# Patient Record
Sex: FEMALE | Employment: OTHER
[De-identification: names, ages, dates, MRNs, and addresses within clinical notes are randomized per-mention and may not be internally consistent; named-entity substitution may affect disease eponyms.]

---

## 2020-11-01 ENCOUNTER — OFFICE VISIT (OUTPATIENT)
Age: 60
End: 2020-11-01

## 2021-01-06 ENCOUNTER — TELEPHONE ENCOUNTER (OUTPATIENT)
Dept: URBAN - METROPOLITAN AREA CLINIC 9 | Facility: CLINIC | Age: 61
End: 2021-01-06

## 2021-01-06 ENCOUNTER — OFFICE VISIT (OUTPATIENT)
Dept: URBAN - METROPOLITAN AREA CLINIC 9 | Facility: CLINIC | Age: 61
End: 2021-01-06

## 2021-01-07 ENCOUNTER — TELEPHONE ENCOUNTER (OUTPATIENT)
Dept: URBAN - METROPOLITAN AREA CLINIC 9 | Facility: CLINIC | Age: 61
End: 2021-01-07

## 2021-01-19 ENCOUNTER — OFFICE VISIT (OUTPATIENT)
Dept: URBAN - METROPOLITAN AREA SURGERY CENTER 9 | Facility: SURGERY CENTER | Age: 61
End: 2021-01-19

## 2021-05-01 ENCOUNTER — OFFICE VISIT (OUTPATIENT)
Age: 61
End: 2021-05-01

## 2021-10-25 ENCOUNTER — OFFICE VISIT (OUTPATIENT)
Age: 61
End: 2021-10-25

## 2022-03-30 ENCOUNTER — OFFICE VISIT (OUTPATIENT)
Dept: URBAN - METROPOLITAN AREA SURGERY CENTER 5 | Facility: SURGERY CENTER | Age: 62
End: 2022-03-30

## 2022-03-30 ENCOUNTER — OFFICE VISIT (OUTPATIENT)
Dept: URBAN - METROPOLITAN AREA CLINIC 7 | Facility: CLINIC | Age: 62
End: 2022-03-30

## 2022-03-30 ENCOUNTER — TELEPHONE ENCOUNTER (OUTPATIENT)
Dept: URBAN - METROPOLITAN AREA CLINIC 9 | Facility: CLINIC | Age: 62
End: 2022-03-30

## 2022-04-07 ENCOUNTER — TELEPHONE ENCOUNTER (OUTPATIENT)
Dept: URBAN - METROPOLITAN AREA CLINIC 9 | Facility: CLINIC | Age: 62
End: 2022-04-07

## 2022-04-08 ENCOUNTER — OFFICE VISIT (OUTPATIENT)
Dept: URBAN - METROPOLITAN AREA CLINIC 7 | Facility: CLINIC | Age: 62
End: 2022-04-08

## 2022-04-09 ENCOUNTER — TELEPHONE ENCOUNTER (OUTPATIENT)
Dept: URBAN - METROPOLITAN AREA CLINIC 9 | Facility: CLINIC | Age: 62
End: 2022-04-09

## 2022-04-11 ENCOUNTER — TELEPHONE ENCOUNTER (OUTPATIENT)
Dept: URBAN - METROPOLITAN AREA CLINIC 9 | Facility: CLINIC | Age: 62
End: 2022-04-11

## 2022-04-12 ENCOUNTER — TELEPHONE ENCOUNTER (OUTPATIENT)
Dept: URBAN - METROPOLITAN AREA CLINIC 9 | Facility: CLINIC | Age: 62
End: 2022-04-12

## 2022-04-12 ENCOUNTER — OFFICE VISIT (OUTPATIENT)
Dept: URBAN - METROPOLITAN AREA CLINIC 7 | Facility: CLINIC | Age: 62
End: 2022-04-12

## 2022-04-22 ENCOUNTER — TELEPHONE ENCOUNTER (OUTPATIENT)
Dept: URBAN - METROPOLITAN AREA CLINIC 9 | Facility: CLINIC | Age: 62
End: 2022-04-22

## 2022-05-17 ENCOUNTER — OFFICE VISIT (OUTPATIENT)
Dept: URBAN - METROPOLITAN AREA SURGERY CENTER 9 | Facility: SURGERY CENTER | Age: 62
End: 2022-05-17

## 2022-05-20 ENCOUNTER — OFFICE VISIT (OUTPATIENT)
Dept: URBAN - METROPOLITAN AREA SURGERY CENTER 9 | Facility: SURGERY CENTER | Age: 62
End: 2022-05-20

## 2022-06-01 ENCOUNTER — TELEPHONE ENCOUNTER (OUTPATIENT)
Dept: URBAN - METROPOLITAN AREA CLINIC 9 | Facility: CLINIC | Age: 62
End: 2022-06-01

## 2022-06-03 ENCOUNTER — OFFICE VISIT (OUTPATIENT)
Dept: URBAN - METROPOLITAN AREA CLINIC 7 | Facility: CLINIC | Age: 62
End: 2022-06-03

## 2022-06-23 ENCOUNTER — TELEPHONE ENCOUNTER (OUTPATIENT)
Dept: URBAN - METROPOLITAN AREA CLINIC 9 | Facility: CLINIC | Age: 62
End: 2022-06-23

## 2022-07-30 ENCOUNTER — TELEPHONE ENCOUNTER (OUTPATIENT)
Age: 62
End: 2022-07-30

## 2022-07-30 RX ORDER — CHOLESTYRAMINE 4 G/9G
1 (ONE) POWDER, FOR SUSPENSION ORAL DAILY
Qty: 0 | Refills: 3 | OUTPATIENT
Start: 2013-06-06 | End: 2014-07-18

## 2022-07-30 RX ORDER — PANTOPRAZOLE SODIUM 40 MG/1
1 (ONE) GRANULE, DELAYED RELEASE ORAL TWICE DAILY
Qty: 0 | Refills: 3 | OUTPATIENT
Start: 2021-01-06 | End: 2022-04-07

## 2022-07-30 RX ORDER — OMEPRAZOLE 40 MG/1
1 CAPSULE, DELAYED RELEASE ORAL DAILY
Qty: 0 | Refills: 7 | OUTPATIENT
Start: 2013-06-11 | End: 2014-07-18

## 2022-07-30 RX ORDER — ERYTHROMYCIN 250 MG/1
1 (ONE) TABLET, FILM COATED ORAL DAILY
Qty: 0 | Refills: 2 | OUTPATIENT
Start: 2014-08-07 | End: 2014-08-12

## 2022-07-30 RX ORDER — BIFIDOBACTERIUM LONGUM 10MM CELL
1 (ONE) CAPSULE ORAL DAILY
Qty: 0 | Refills: 2 | OUTPATIENT
Start: 2014-07-18 | End: 2014-08-17

## 2022-07-30 RX ORDER — DICYCLOMINE HYDROCHLORIDE 10 MG/1
1 (ONE) CAPSULE ORAL
Qty: 0 | Refills: 2 | OUTPATIENT
Start: 2022-04-08 | End: 2022-05-08

## 2022-07-30 RX ORDER — OMEPRAZOLE 20 MG/1
1 (ONE) CAPSULE, DELAYED RELEASE ORAL DAILY
Qty: 0 | Refills: 5 | OUTPATIENT
Start: 2014-08-01 | End: 2017-10-17

## 2022-07-30 RX ORDER — SACCHAROMYCES BOULARDII 250 MG
1 CAPSULE ORAL
Qty: 0 | Refills: 5 | OUTPATIENT
Start: 2013-06-10 | End: 2014-07-18

## 2022-07-30 RX ORDER — LORATADINE 5 MG
17 GRAMS TABLET,CHEWABLE ORAL
Qty: 0 | Refills: 16 | OUTPATIENT
Start: 2014-07-18 | End: 2017-10-17

## 2022-07-31 ENCOUNTER — TELEPHONE ENCOUNTER (OUTPATIENT)
Age: 62
End: 2022-07-31

## 2022-07-31 RX ORDER — CHOLESTYRAMINE 4 G/9G
1 (ONE) POWDER, FOR SUSPENSION ORAL DAILY
Qty: 0 | Refills: 3 | Status: ACTIVE | COMMUNITY
Start: 2013-06-06

## 2022-07-31 RX ORDER — BIFIDOBACTERIUM LONGUM 10MM CELL
1 (ONE) CAPSULE ORAL DAILY
Qty: 0 | Refills: 2 | Status: ACTIVE | COMMUNITY
Start: 2014-07-18

## 2022-07-31 RX ORDER — LORATADINE 5 MG
17 GRAMS TABLET,CHEWABLE ORAL
Qty: 0 | Refills: 16 | Status: ACTIVE | COMMUNITY
Start: 2014-07-18

## 2022-07-31 RX ORDER — SACCHAROMYCES BOULARDII 250 MG
1 CAPSULE ORAL
Qty: 0 | Refills: 5 | Status: ACTIVE | COMMUNITY
Start: 2013-06-10

## 2022-07-31 RX ORDER — SUCRALFATE 1 G/1
1 (ONE) TABLET ORAL
Qty: 0 | Refills: 4 | Status: ACTIVE | COMMUNITY
Start: 2022-03-30

## 2022-07-31 RX ORDER — OMEPRAZOLE 40 MG/1
1 CAPSULE, DELAYED RELEASE ORAL DAILY
Qty: 0 | Refills: 7 | Status: ACTIVE | COMMUNITY
Start: 2013-06-11

## 2022-07-31 RX ORDER — DICYCLOMINE HYDROCHLORIDE 10 MG/1
1 (ONE) CAPSULE ORAL
Qty: 0 | Refills: 2 | Status: ACTIVE | COMMUNITY
Start: 2022-04-08

## 2022-07-31 RX ORDER — OMEPRAZOLE 20 MG/1
1 (ONE) CAPSULE, DELAYED RELEASE ORAL DAILY
Qty: 0 | Refills: 5 | Status: ACTIVE | COMMUNITY
Start: 2014-08-01

## 2022-07-31 RX ORDER — BACITRACIN 500 UNIT/G
2 (TWO) CAPSULE OINTMENT (GRAM) TOPICAL DAILY
Qty: 0 | Refills: 16 | Status: ACTIVE | COMMUNITY
Start: 2018-11-06

## 2022-07-31 RX ORDER — COLESTIPOL HYDROCHLORIDE 1 G/1
1 (ONE) TABLET, FILM COATED ORAL
Qty: 0 | Refills: 5 | Status: ACTIVE | COMMUNITY
Start: 2022-04-12

## 2022-07-31 RX ORDER — PANTOPRAZOLE SODIUM 40 MG/1
1 (ONE) GRANULE, DELAYED RELEASE ORAL
Qty: 0 | Refills: 3 | Status: ACTIVE | COMMUNITY
Start: 2021-01-06

## 2022-07-31 RX ORDER — ERYTHROMYCIN 250 MG/1
1 (ONE) CAPSULE, DELAYED RELEASE PELLETS ORAL DAILY
Qty: 0 | Refills: 2 | Status: ACTIVE | COMMUNITY
Start: 2014-08-07

## 2022-07-31 RX ORDER — SACCHAROMYCES BOULARDII 250 MG
1 CAPSULE ORAL
Qty: 0 | Refills: 5 | Status: ACTIVE | COMMUNITY
Start: 2019-05-06

## 2025-01-03 ENCOUNTER — TELEPHONE ENCOUNTER (OUTPATIENT)
Dept: URBAN - METROPOLITAN AREA CLINIC 7 | Facility: CLINIC | Age: 65
End: 2025-01-03

## 2025-01-10 ENCOUNTER — OFFICE VISIT (OUTPATIENT)
Dept: URBAN - METROPOLITAN AREA CLINIC 9 | Facility: CLINIC | Age: 65
End: 2025-01-10

## 2025-01-21 ENCOUNTER — OFFICE VISIT (OUTPATIENT)
Dept: URBAN - METROPOLITAN AREA CLINIC 7 | Facility: CLINIC | Age: 65
End: 2025-01-21
Payer: COMMERCIAL

## 2025-01-21 ENCOUNTER — LAB OUTSIDE AN ENCOUNTER (OUTPATIENT)
Dept: URBAN - METROPOLITAN AREA CLINIC 7 | Facility: CLINIC | Age: 65
End: 2025-01-21

## 2025-01-21 ENCOUNTER — DASHBOARD ENCOUNTERS (OUTPATIENT)
Age: 65
End: 2025-01-21

## 2025-01-21 VITALS
HEART RATE: 74 BPM | SYSTOLIC BLOOD PRESSURE: 130 MMHG | DIASTOLIC BLOOD PRESSURE: 78 MMHG | OXYGEN SATURATION: 94 % | HEIGHT: 65 IN | WEIGHT: 285 LBS | BODY MASS INDEX: 47.48 KG/M2 | TEMPERATURE: 97.6 F

## 2025-01-21 DIAGNOSIS — R03.0 ELEVATED BP WITHOUT DIAGNOSIS OF HYPERTENSION: ICD-10-CM

## 2025-01-21 DIAGNOSIS — R10.31 RLQ ABDOMINAL PAIN: ICD-10-CM

## 2025-01-21 DIAGNOSIS — R19.4 CHANGE IN BOWEL HABITS: ICD-10-CM

## 2025-01-21 DIAGNOSIS — M62.89 PELVIC FLOOR DYSFUNCTION: ICD-10-CM

## 2025-01-21 DIAGNOSIS — K31.84 GASTROPARESIS: ICD-10-CM

## 2025-01-21 DIAGNOSIS — R68.81 EARLY SATIETY: ICD-10-CM

## 2025-01-21 DIAGNOSIS — K21.9 GASTROESOPHAGEAL REFLUX DISEASE, ESOPHAGITIS PRESENCE NOT SPECIFIED: ICD-10-CM

## 2025-01-21 DIAGNOSIS — K76.0 HEPATIC STEATOSIS: ICD-10-CM

## 2025-01-21 PROBLEM — 301754002: Status: ACTIVE | Noted: 2025-01-21

## 2025-01-21 PROBLEM — 711263002: Status: ACTIVE | Noted: 2025-01-21

## 2025-01-21 PROBLEM — 88111009: Status: ACTIVE | Noted: 2025-01-21

## 2025-01-21 PROCEDURE — 99204 OFFICE O/P NEW MOD 45 MIN: CPT | Performed by: INTERNAL MEDICINE

## 2025-01-21 RX ORDER — METOPROLOL TARTRATE 25 MG/1
1 TABLET WITH FOOD TABLET, FILM COATED ORAL TWICE A DAY
Qty: 180 TABLET | Refills: 0 | Status: ACTIVE | COMMUNITY

## 2025-01-21 RX ORDER — DICYCLOMINE HYDROCHLORIDE 10 MG/1
1 (ONE) CAPSULE ORAL
Qty: 0 | Refills: 2 | Status: DISCONTINUED | COMMUNITY
Start: 2022-04-08

## 2025-01-21 RX ORDER — PANTOPRAZOLE SODIUM 40 MG/1
1 (ONE) GRANULE, DELAYED RELEASE ORAL
Qty: 0 | Refills: 3 | Status: DISCONTINUED | COMMUNITY
Start: 2021-01-06

## 2025-01-21 RX ORDER — PANTOPRAZOLE SODIUM 40 MG/1
1 TABLET TABLET, DELAYED RELEASE ORAL ONCE A DAY
Status: ACTIVE | COMMUNITY

## 2025-01-21 RX ORDER — SUCRALFATE 1 G/1
1 (ONE) TABLET ORAL
Qty: 0 | Refills: 4 | Status: DISCONTINUED | COMMUNITY
Start: 2022-03-30

## 2025-01-21 RX ORDER — BIFIDOBACTERIUM LONGUM 10MM CELL
1 (ONE) CAPSULE ORAL DAILY
Qty: 0 | Refills: 2 | Status: DISCONTINUED | COMMUNITY
Start: 2014-07-18

## 2025-01-21 RX ORDER — BACITRACIN 500 UNIT/G
2 (TWO) CAPSULE OINTMENT (GRAM) TOPICAL DAILY
Qty: 0 | Refills: 16 | Status: DISCONTINUED | COMMUNITY
Start: 2018-11-06

## 2025-01-21 RX ORDER — CHOLESTYRAMINE 4 G/9G
1 (ONE) POWDER, FOR SUSPENSION ORAL DAILY
Qty: 0 | Refills: 3 | Status: DISCONTINUED | COMMUNITY
Start: 2013-06-06

## 2025-01-21 RX ORDER — OMEPRAZOLE 40 MG/1
1 CAPSULE, DELAYED RELEASE ORAL DAILY
Qty: 0 | Refills: 7 | Status: DISCONTINUED | COMMUNITY
Start: 2013-06-11

## 2025-01-21 RX ORDER — LORATADINE 5 MG
17 GRAMS TABLET,CHEWABLE ORAL
Qty: 0 | Refills: 16 | Status: DISCONTINUED | COMMUNITY
Start: 2014-07-18

## 2025-01-21 RX ORDER — COLESTIPOL HYDROCHLORIDE 1 G/1
1 (ONE) TABLET, FILM COATED ORAL
Qty: 0 | Refills: 5 | Status: DISCONTINUED | COMMUNITY
Start: 2022-04-12

## 2025-01-21 RX ORDER — OMEPRAZOLE 20 MG/1
1 (ONE) CAPSULE, DELAYED RELEASE ORAL DAILY
Qty: 0 | Refills: 5 | Status: DISCONTINUED | COMMUNITY
Start: 2014-08-01

## 2025-01-21 RX ORDER — ERYTHROMYCIN 250 MG/1
1 (ONE) CAPSULE, DELAYED RELEASE PELLETS ORAL DAILY
Qty: 0 | Refills: 2 | Status: DISCONTINUED | COMMUNITY
Start: 2014-08-07

## 2025-01-21 RX ORDER — SACCHAROMYCES BOULARDII 250 MG
1 CAPSULE ORAL
Qty: 0 | Refills: 5 | Status: DISCONTINUED | COMMUNITY
Start: 2013-06-10

## 2025-01-21 RX ORDER — POLYETHYLENE GLYCOL 3350 17 G/DOSE
1 SCOOP MIXED WITH 8 OUNCES OF FLUID POWDER (GRAM) ORAL ONCE A DAY
Status: ACTIVE | COMMUNITY

## 2025-01-21 NOTE — HPI-TODAY'S VISIT:
Patient is seen today with a complaint of abdominal  pain that has been recurrent and intermittent x last 2 months. The pain was initially of abrupt onset and localized to the RLQ. Since then the pain has been more diffuse and also associated with back pain. Over the last week has also noted a change in bowel habits with constipation and change in calibre of stools. Has a sense of incomplte evacuation so started mirilax . Has seen mucus but no blood. No exacerbating factors - unrelated to eating,change in position ,activity. Relieving factors - none. Severity mild/moderate Not associated with nausea,vomiting currently but does have hx of gastropatesis and chronic early satiety . No recent fevers or chills Aprox 7lb weight loss No dark urine or alcoholic stools. No significant NSAID use history.  Eval todate has included CT and US per primary without acute finding.

## 2025-01-21 NOTE — PHYSICAL EXAM HENT:
Head, normocephalic, atraumatic, Face, Face within normal limits, Ears, External ears within normal limits
<-- Click to add NO significant Past Surgical History

## 2025-01-22 ENCOUNTER — TELEPHONE ENCOUNTER (OUTPATIENT)
Dept: URBAN - METROPOLITAN AREA CLINIC 7 | Facility: CLINIC | Age: 65
End: 2025-01-22

## 2025-01-23 ENCOUNTER — CLAIMS CREATED FROM THE CLAIM WINDOW (OUTPATIENT)
Dept: URBAN - METROPOLITAN AREA SURGERY CENTER 5 | Facility: SURGERY CENTER | Age: 65
End: 2025-01-23
Payer: COMMERCIAL

## 2025-01-23 ENCOUNTER — CLAIMS CREATED FROM THE CLAIM WINDOW (OUTPATIENT)
Dept: URBAN - METROPOLITAN AREA CLINIC 4 | Facility: CLINIC | Age: 65
End: 2025-01-23
Payer: COMMERCIAL

## 2025-01-23 DIAGNOSIS — R19.4 CHANGE IN BOWEL HABITS: ICD-10-CM

## 2025-01-23 DIAGNOSIS — K63.89 OTHER SPECIFIED DISEASES OF INTESTINE: ICD-10-CM

## 2025-01-23 DIAGNOSIS — K57.30 DIVERTICULOSIS OF LARGE INTESTINE WITHOUT PERFORATION OR ABSCESS WITHOUT BLEEDING: ICD-10-CM

## 2025-01-23 DIAGNOSIS — R10.30 LOWER ABDOMINAL PAIN, UNSPECIFIED: ICD-10-CM

## 2025-01-23 DIAGNOSIS — K64.8 OTHER HEMORRHOIDS: ICD-10-CM

## 2025-01-23 DIAGNOSIS — K64.0 FIRST DEGREE HEMORRHOIDS: ICD-10-CM

## 2025-01-23 DIAGNOSIS — K57.30 DIVERTICULOSIS, LEFT-SIDED: ICD-10-CM

## 2025-01-23 PROCEDURE — 00811 ANES LWR INTST NDSC NOS: CPT | Performed by: NURSE ANESTHETIST, CERTIFIED REGISTERED

## 2025-01-23 PROCEDURE — 45380 COLONOSCOPY AND BIOPSY: CPT | Performed by: INTERNAL MEDICINE

## 2025-01-23 PROCEDURE — 88305 TISSUE EXAM BY PATHOLOGIST: CPT | Performed by: PATHOLOGY

## 2025-01-23 RX ORDER — PANTOPRAZOLE SODIUM 40 MG/1
1 TABLET TABLET, DELAYED RELEASE ORAL ONCE A DAY
Status: ACTIVE | COMMUNITY

## 2025-01-23 RX ORDER — METOPROLOL TARTRATE 25 MG/1
1 TABLET WITH FOOD TABLET, FILM COATED ORAL TWICE A DAY
Qty: 180 TABLET | Refills: 0 | Status: ACTIVE | COMMUNITY

## 2025-01-23 RX ORDER — POLYETHYLENE GLYCOL 3350 17 G/DOSE
1 SCOOP MIXED WITH 8 OUNCES OF FLUID POWDER (GRAM) ORAL ONCE A DAY
Status: ACTIVE | COMMUNITY

## 2025-02-06 ENCOUNTER — TELEPHONE ENCOUNTER (OUTPATIENT)
Dept: URBAN - METROPOLITAN AREA CLINIC 7 | Facility: CLINIC | Age: 65
End: 2025-02-06